# Patient Record
Sex: MALE | Race: WHITE | Employment: FULL TIME | URBAN - METROPOLITAN AREA
[De-identification: names, ages, dates, MRNs, and addresses within clinical notes are randomized per-mention and may not be internally consistent; named-entity substitution may affect disease eponyms.]

---

## 2018-05-21 LAB
AMB EXT CHOLESTEROL, TOTAL: 171 MG/DL
AMB EXT HDL CHOLESTEROL: 55 MG/DL
AMB EXT LDL CHOLESTEROL, DIRECT: 99 MG/DL
AMB EXT PSA SCREEN: 2.4 NG/ML
AMB EXT TRIGLYCERIDES: 87 MG/DL

## 2018-08-18 ENCOUNTER — APPOINTMENT (OUTPATIENT)
Dept: GENERAL RADIOLOGY | Age: 53
End: 2018-08-18
Attending: NURSE PRACTITIONER
Payer: COMMERCIAL

## 2018-08-18 ENCOUNTER — HOSPITAL ENCOUNTER (EMERGENCY)
Age: 53
Discharge: HOME | End: 2018-08-18
Attending: EMERGENCY MEDICINE
Payer: COMMERCIAL

## 2018-08-18 VITALS
SYSTOLIC BLOOD PRESSURE: 118 MMHG | HEART RATE: 87 BPM | WEIGHT: 260 LBS | RESPIRATION RATE: 18 BRPM | HEIGHT: 75 IN | BODY MASS INDEX: 32.33 KG/M2 | DIASTOLIC BLOOD PRESSURE: 74 MMHG | TEMPERATURE: 98 F | OXYGEN SATURATION: 97 %

## 2018-08-18 DIAGNOSIS — R06.00 DYSPNEA, UNSPECIFIED TYPE: Primary | ICD-10-CM

## 2018-08-18 LAB
ALBUMIN SERPL-MCNC: 4.1 G/DL (ref 3.5–4.8)
ALBUMIN/GLOB SERPL: 1.2 {RATIO} (ref 1–2)
ALP LIVER SERPL-CCNC: 55 U/L (ref 45–117)
ALT SERPL-CCNC: 32 U/L (ref 17–63)
ANION GAP SERPL CALC-SCNC: 11 MMOL/L (ref 0–18)
AST SERPL-CCNC: 21 U/L (ref 15–41)
ATRIAL RATE: 89 BPM
BASOPHILS # BLD AUTO: 0.04 X10(3) UL (ref 0–0.1)
BASOPHILS NFR BLD AUTO: 0.6 %
BILIRUB SERPL-MCNC: 0.6 MG/DL (ref 0.1–2)
BUN BLD-MCNC: 17 MG/DL (ref 8–20)
BUN/CREAT SERPL: 15.2 (ref 10–20)
CALCIUM BLD-MCNC: 8.6 MG/DL (ref 8.3–10.3)
CHLORIDE SERPL-SCNC: 105 MMOL/L (ref 101–111)
CO2 SERPL-SCNC: 23 MMOL/L (ref 22–32)
CREAT BLD-MCNC: 1.12 MG/DL (ref 0.7–1.3)
D-DIMER: <0.27 UG/ML FEU (ref 0–0.49)
EOSINOPHIL # BLD AUTO: 0.15 X10(3) UL (ref 0–0.3)
EOSINOPHIL NFR BLD AUTO: 2.4 %
ERYTHROCYTE [DISTWIDTH] IN BLOOD BY AUTOMATED COUNT: 12.3 % (ref 11.5–16)
GLOBULIN PLAS-MCNC: 3.3 G/DL (ref 2.5–4)
GLUCOSE BLD-MCNC: 98 MG/DL (ref 70–99)
HCT VFR BLD AUTO: 41 % (ref 37–53)
HGB BLD-MCNC: 14.3 G/DL (ref 13–17)
IMMATURE GRANULOCYTE COUNT: 0.02 X10(3) UL (ref 0–1)
IMMATURE GRANULOCYTE RATIO %: 0.3 %
LYMPHOCYTES # BLD AUTO: 1.35 X10(3) UL (ref 0.9–4)
LYMPHOCYTES NFR BLD AUTO: 21.9 %
M PROTEIN MFR SERPL ELPH: 7.4 G/DL (ref 6.1–8.3)
MCH RBC QN AUTO: 30.3 PG (ref 27–33.2)
MCHC RBC AUTO-ENTMCNC: 34.9 G/DL (ref 31–37)
MCV RBC AUTO: 86.9 FL (ref 80–99)
MONOCYTES # BLD AUTO: 0.55 X10(3) UL (ref 0.1–1)
MONOCYTES NFR BLD AUTO: 8.9 %
NEUTROPHIL ABS PRELIM: 4.06 X10 (3) UL (ref 1.3–6.7)
NEUTROPHILS # BLD AUTO: 4.06 X10(3) UL (ref 1.3–6.7)
NEUTROPHILS NFR BLD AUTO: 65.9 %
OSMOLALITY SERPL CALC.SUM OF ELEC: 290 MOSM/KG (ref 275–295)
P AXIS: 39 DEGREES
P-R INTERVAL: 202 MS
PLATELET # BLD AUTO: 220 10(3)UL (ref 150–450)
POTASSIUM SERPL-SCNC: 3.7 MMOL/L (ref 3.6–5.1)
Q-T INTERVAL: 376 MS
QRS DURATION: 94 MS
QTC CALCULATION (BEZET): 457 MS
R AXIS: -25 DEGREES
RBC # BLD AUTO: 4.72 X10(6)UL (ref 4.3–5.7)
RED CELL DISTRIBUTION WIDTH-SD: 39.6 FL (ref 35.1–46.3)
SODIUM SERPL-SCNC: 139 MMOL/L (ref 136–144)
T AXIS: 29 DEGREES
TROPONIN I SERPL-MCNC: <0.046 NG/ML (ref ?–0.05)
VENTRICULAR RATE: 89 BPM
WBC # BLD AUTO: 6.2 X10(3) UL (ref 4–13)

## 2018-08-18 PROCEDURE — 85378 FIBRIN DEGRADE SEMIQUANT: CPT | Performed by: NURSE PRACTITIONER

## 2018-08-18 PROCEDURE — 84484 ASSAY OF TROPONIN QUANT: CPT | Performed by: NURSE PRACTITIONER

## 2018-08-18 PROCEDURE — 80053 COMPREHEN METABOLIC PANEL: CPT | Performed by: NURSE PRACTITIONER

## 2018-08-18 PROCEDURE — 99285 EMERGENCY DEPT VISIT HI MDM: CPT

## 2018-08-18 PROCEDURE — 36415 COLL VENOUS BLD VENIPUNCTURE: CPT

## 2018-08-18 PROCEDURE — 85025 COMPLETE CBC W/AUTO DIFF WBC: CPT | Performed by: NURSE PRACTITIONER

## 2018-08-18 PROCEDURE — 71046 X-RAY EXAM CHEST 2 VIEWS: CPT | Performed by: NURSE PRACTITIONER

## 2018-08-18 PROCEDURE — 93010 ELECTROCARDIOGRAM REPORT: CPT

## 2018-08-18 PROCEDURE — 93005 ELECTROCARDIOGRAM TRACING: CPT

## 2018-08-18 RX ORDER — ALBUTEROL SULFATE 90 UG/1
2 AEROSOL, METERED RESPIRATORY (INHALATION) EVERY 4 HOURS PRN
Qty: 1 INHALER | Refills: 0 | Status: SHIPPED | OUTPATIENT
Start: 2018-08-18 | End: 2018-09-17

## 2018-08-18 NOTE — ED INITIAL ASSESSMENT (HPI)
Pt sts he was working out and became SOB. Sts he feels like it is hard to take a deep breath. Denies any chest pain. Sts he was diagnosed with asthma 10 years ago, but has not had an exacerbation for 10 years. Airway /lungs clear.

## 2018-08-18 NOTE — ED PROVIDER NOTES
Patient Seen in: [facility] In [place]    History   Patient presents with:  Dyspnea CYN SOB (respiratory)    Stated Complaint: difficulty breathing    59-year-old male who presents to the emergency room with complaints of dyspnea.   Pa All other systems reviewed and negative except as noted above.     Physical Exam   ED Triage Vitals [08/18/18 1312]  BP: 115/81  Pulse: 92  Resp: 16  Temp: 97.8 °F (36.6 °C)  Temp src: Temporal  SpO2: 98 %  O2 Device: None (Room air)    Current:/7 Abnormality         Status                     ---------                               -----------         ------                     CBC W/ DIFFERENTIAL[[id number]]                              Final result                 Please R-0

## 2019-01-27 ENCOUNTER — OFFICE VISIT (OUTPATIENT)
Dept: FAMILY MEDICINE CLINIC | Facility: CLINIC | Age: 54
End: 2019-01-27
Payer: COMMERCIAL

## 2019-01-27 VITALS
RESPIRATION RATE: 16 BRPM | TEMPERATURE: 98 F | HEART RATE: 56 BPM | DIASTOLIC BLOOD PRESSURE: 72 MMHG | SYSTOLIC BLOOD PRESSURE: 112 MMHG | WEIGHT: 255.38 LBS | BODY MASS INDEX: 32 KG/M2 | OXYGEN SATURATION: 99 %

## 2019-01-27 DIAGNOSIS — H10.9 CONJUNCTIVITIS OF LEFT EYE, UNSPECIFIED CONJUNCTIVITIS TYPE: Primary | ICD-10-CM

## 2019-01-27 PROCEDURE — 99202 OFFICE O/P NEW SF 15 MIN: CPT | Performed by: NURSE PRACTITIONER

## 2019-01-27 RX ORDER — OFLOXACIN 3 MG/ML
2 SOLUTION/ DROPS OPHTHALMIC 4 TIMES DAILY
Qty: 1 BOTTLE | Refills: 0 | Status: SHIPPED | OUTPATIENT
Start: 2019-01-27 | End: 2019-02-03

## 2019-01-27 NOTE — PROGRESS NOTES
CHIEF COMPLAINT:   Patient presents with:  Eye Problem: left eye, red, started today      HPI:   Gabrielle Sanchez is a 48year old male who presents with chief complaint of eye irritation. Symptoms began 1  days ago. Symptoms have been unchanged since onset. SpO2 99%   BMI 31.92 kg/m²   GENERAL: well developed, well nourished,in no apparent distress  SKIN: no rashes,no suspicious lesions  EYES: PERRLA, EOMI, pos conjunctiva erythematous, injected, pos discharge.   HENT: atraumatic, normocephalic, TMs non injec

## 2019-02-05 ENCOUNTER — OFFICE VISIT (OUTPATIENT)
Dept: FAMILY MEDICINE CLINIC | Facility: CLINIC | Age: 54
End: 2019-02-05
Payer: COMMERCIAL

## 2019-02-05 VITALS
HEIGHT: 75.5 IN | TEMPERATURE: 98 F | WEIGHT: 256 LBS | SYSTOLIC BLOOD PRESSURE: 110 MMHG | BODY MASS INDEX: 31.5 KG/M2 | RESPIRATION RATE: 16 BRPM | HEART RATE: 60 BPM | DIASTOLIC BLOOD PRESSURE: 70 MMHG

## 2019-02-05 DIAGNOSIS — E78.00 PURE HYPERCHOLESTEROLEMIA: Primary | ICD-10-CM

## 2019-02-05 DIAGNOSIS — R21 RASH: ICD-10-CM

## 2019-02-05 PROCEDURE — 99203 OFFICE O/P NEW LOW 30 MIN: CPT | Performed by: FAMILY MEDICINE

## 2019-02-05 RX ORDER — ATORVASTATIN CALCIUM 10 MG/1
10 TABLET, FILM COATED ORAL NIGHTLY
Qty: 90 TABLET | Refills: 3 | Status: SHIPPED | OUTPATIENT
Start: 2019-02-05 | End: 2020-02-17

## 2019-02-05 NOTE — PROGRESS NOTES
Rg Longoria is a 48year old male and is new to our office. Patient presents today to establish care. The patient's history includes:  Gets executive physical yearly at [facility]. Labs done there every May. · Lipids:  Atorvastatin 10mg daily.   Last LDL insecurity - inability: Not on file      Transportation needs - medical: Not on file      Transportation needs - non-medical: Not on file    Occupational History      Not on file    Tobacco Use      Smoking status: Never Smoker      Smokeless tobacco: Kike Gonzalez

## 2019-04-02 ENCOUNTER — OFFICE VISIT (OUTPATIENT)
Dept: FAMILY MEDICINE CLINIC | Facility: CLINIC | Age: 54
End: 2019-04-02
Payer: COMMERCIAL

## 2019-04-02 VITALS
HEIGHT: 75.5 IN | SYSTOLIC BLOOD PRESSURE: 120 MMHG | BODY MASS INDEX: 32.12 KG/M2 | DIASTOLIC BLOOD PRESSURE: 70 MMHG | RESPIRATION RATE: 16 BRPM | WEIGHT: 261 LBS | TEMPERATURE: 98 F | HEART RATE: 60 BPM

## 2019-04-02 DIAGNOSIS — R22.9 SKIN LUMPS: Primary | ICD-10-CM

## 2019-04-02 DIAGNOSIS — R21 RASH AND NONSPECIFIC SKIN ERUPTION: ICD-10-CM

## 2019-04-02 PROCEDURE — 99213 OFFICE O/P EST LOW 20 MIN: CPT | Performed by: FAMILY MEDICINE

## 2019-04-02 NOTE — PROGRESS NOTES
Chief Complaint:  Patient presents with:  Rash: Itchy Rash on both sides of Hips- worse at night, noticed bump on Right side of abdomen    HPI:  This is a 48year old male patient presenting for Rash (Itchy Rash on both sides of Hips- worse at night, notic session: 1 or 2      Binge frequency: Less than monthly    Drug use: Not on file         Current Outpatient Medications:  atorvastatin 10 MG Oral Tab Take 1 tablet (10 mg total) by mouth nightly.  Disp: 90 tablet Rfl: 3   triamcinolone acetonide 0.1 % Exter returning to the clinic reviewed and patient demonstrated understanding.   Randolph Donaldson DO  4/2/2019 10:32 AM  Family Medicine

## 2019-05-03 ENCOUNTER — HOSPITAL ENCOUNTER (OUTPATIENT)
Dept: ULTRASOUND IMAGING | Age: 54
Discharge: HOME | End: 2019-05-03
Attending: FAMILY MEDICINE
Payer: COMMERCIAL

## 2019-05-03 DIAGNOSIS — R22.9 SKIN LUMPS: ICD-10-CM

## 2019-05-03 PROCEDURE — 76705 ECHO EXAM OF ABDOMEN: CPT | Performed by: FAMILY MEDICINE

## 2019-05-07 ENCOUNTER — TELEPHONE (OUTPATIENT)
Dept: FAMILY MEDICINE CLINIC | Facility: CLINIC | Age: 54
End: 2019-05-07

## 2019-05-07 NOTE — TELEPHONE ENCOUNTER
We could refer him to another derm but I would actually have him call back Dr Dillon Reyes office and make sure Dr Yi Parker is aware he is not better. His note directly says to call if not better in 2 weeks.  I would expect he had another idea if this was not

## 2019-05-07 NOTE — TELEPHONE ENCOUNTER
Called and talked to patient he has finished the keflex and has no improvement in the bumps stated that they are worse feel scaly.  He tried to call the office as directed by Dr Rachel Tran and they told him he could not be seen until mid June.  patient stated

## 2019-05-07 NOTE — TELEPHONE ENCOUNTER
Patient has been seen for a rash on his hips and abdomen that has not gotten better, starting to become scaly. Patient has seen Dr. Catarino Pfeiffer but is unable to see him again until mid June.  Patient is wondering if there is another dermatologist he can be refe

## 2019-06-26 ENCOUNTER — TELEPHONE (OUTPATIENT)
Dept: FAMILY MEDICINE CLINIC | Facility: CLINIC | Age: 54
End: 2019-06-26

## 2019-06-26 DIAGNOSIS — Z23 NEED FOR SHINGLES VACCINE: Primary | ICD-10-CM

## 2019-07-05 PROBLEM — E78.00 PURE HYPERCHOLESTEROLEMIA: Status: ACTIVE | Noted: 2017-04-03

## 2019-07-05 PROBLEM — L91.8 ACROCHORDON: Status: ACTIVE | Noted: 2017-04-05

## 2019-07-05 PROBLEM — R73.9 HYPERGLYCEMIA: Status: ACTIVE | Noted: 2017-04-03

## 2019-07-05 PROBLEM — Z96.653 PRESENCE OF BOTH ARTIFICIAL KNEE JOINTS: Status: ACTIVE | Noted: 2017-04-03

## 2019-07-05 PROBLEM — D22.9 MULTIPLE BENIGN MELANOCYTIC NEVI: Status: ACTIVE | Noted: 2017-04-05

## 2019-07-05 RX ORDER — SILDENAFIL CITRATE 20 MG/1
TABLET ORAL
Refills: 2 | COMMUNITY
Start: 2019-04-26

## 2019-07-08 ENCOUNTER — NURSE ONLY (OUTPATIENT)
Dept: FAMILY MEDICINE CLINIC | Facility: CLINIC | Age: 54
End: 2019-07-08
Payer: COMMERCIAL

## 2019-07-08 DIAGNOSIS — Z23 NEED FOR VACCINATION: Primary | ICD-10-CM

## 2019-07-08 PROCEDURE — 90750 HZV VACC RECOMBINANT IM: CPT | Performed by: FAMILY MEDICINE

## 2019-07-08 PROCEDURE — 90471 IMMUNIZATION ADMIN: CPT | Performed by: FAMILY MEDICINE

## 2019-07-08 NOTE — PROGRESS NOTES
Patient presents today for his first Shingrix vaccine. He has no complaints. He is given the VIS. The chart and vaccine was reviewed by myself and Tremaine Melton RN. The Shingrix vaccine was ordered by Med Cortes PA-C.   The patient is reminded to c

## 2019-08-21 NOTE — TELEPHONE ENCOUNTER
LOV 4/2/2019     Patient was asked to follow-up in:Follow-up timeframe not documented in note     Appointment scheduled: Visit date not found     Refill request for:    Requested Prescriptions     Pending Prescriptions Disp Refills   • TRIAMCINOLONE ACETON

## 2019-09-27 NOTE — TELEPHONE ENCOUNTER
Pt lov 4/2/19, no recent lab work, no upcoming visit. Is it ok to refill Triamcinolone cream? Please advise.  Routed to Dr Robby Benavides

## 2019-11-13 NOTE — TELEPHONE ENCOUNTER
LOV 4/2/19, no recent labs, no upcoming appointment  Will you authorize refill on Triamcinolone Cream?

## 2019-11-18 ENCOUNTER — NURSE ONLY (OUTPATIENT)
Dept: FAMILY MEDICINE CLINIC | Facility: CLINIC | Age: 54
End: 2019-11-18
Payer: COMMERCIAL

## 2019-11-18 ENCOUNTER — TELEPHONE (OUTPATIENT)
Dept: FAMILY MEDICINE CLINIC | Facility: CLINIC | Age: 54
End: 2019-11-18

## 2019-11-18 DIAGNOSIS — Z23 NEED FOR SHINGLES VACCINE: Primary | ICD-10-CM

## 2019-11-18 PROCEDURE — 90750 HZV VACC RECOMBINANT IM: CPT | Performed by: FAMILY MEDICINE

## 2019-11-18 PROCEDURE — 90471 IMMUNIZATION ADMIN: CPT | Performed by: FAMILY MEDICINE

## 2019-11-18 NOTE — PROGRESS NOTES
Patient presents for his second dose of Shingrix. He is given the VIS. He has signed a waiver. The Shingrix is given in the left deltoid.   After sitting for a few minutes the patient left the office without complaint

## 2019-11-18 NOTE — TELEPHONE ENCOUNTER
Patient has appt today for his second dose of Shingrix. First dose was given 7/8/19. Order pended for your review.

## 2020-02-17 RX ORDER — ATORVASTATIN CALCIUM 10 MG/1
TABLET, FILM COATED ORAL
Qty: 30 TABLET | Refills: 0 | Status: SHIPPED | OUTPATIENT
Start: 2020-02-17 | End: 2020-03-03

## 2020-02-17 NOTE — TELEPHONE ENCOUNTER
Requested Prescriptions     Pending Prescriptions Disp Refills   • ATORVASTATIN 10 MG Oral Tab [Pharmacy Med Name: ATORVASTATIN 10MG TABLETS] 90 tablet 3     Sig: TAKE 1 TABLET(10 MG) BY MOUTH EVERY NIGHT     LOV 4/2/2019 (acute), 2/5/19 physical     Patie

## 2020-03-03 ENCOUNTER — OFFICE VISIT (OUTPATIENT)
Dept: FAMILY MEDICINE CLINIC | Facility: CLINIC | Age: 55
End: 2020-03-03
Payer: COMMERCIAL

## 2020-03-03 VITALS
BODY MASS INDEX: 34.31 KG/M2 | HEIGHT: 74.5 IN | HEART RATE: 68 BPM | TEMPERATURE: 98 F | WEIGHT: 270.19 LBS | DIASTOLIC BLOOD PRESSURE: 86 MMHG | SYSTOLIC BLOOD PRESSURE: 122 MMHG | RESPIRATION RATE: 16 BRPM

## 2020-03-03 DIAGNOSIS — E78.00 PURE HYPERCHOLESTEROLEMIA: Primary | ICD-10-CM

## 2020-03-03 DIAGNOSIS — C61 PROSTATE CANCER (HCC): ICD-10-CM

## 2020-03-03 PROCEDURE — 99214 OFFICE O/P EST MOD 30 MIN: CPT | Performed by: FAMILY MEDICINE

## 2020-03-03 RX ORDER — ASPIRIN 81 MG/1
81 TABLET ORAL DAILY
COMMUNITY
Start: 2019-10-02

## 2020-03-03 RX ORDER — ATORVASTATIN CALCIUM 10 MG/1
TABLET, FILM COATED ORAL
Qty: 90 TABLET | Refills: 3 | Status: SHIPPED | OUTPATIENT
Start: 2020-03-03 | End: 2021-04-06

## 2020-03-03 NOTE — PROGRESS NOTES
Renzo Green is a 47year old male. Gets executive physical yearly at [facility]. Labs done there every May. · Lipids:  Atorvastatin 10mg daily. Last LDL 99.  · CORAL:  Never used CPAP  · Seeing Dr. George Fernandez: hx of squamous cell. · Hx of shingles in 2017. insecurity:        Worry: Not on file        Inability: Not on file      Transportation needs:        Medical: Not on file        Non-medical: Not on file    Tobacco Use      Smoking status: Never Smoker      Smokeless tobacco: Never Used    Substance and Temp 98 °F (36.7 °C) (Oral)   Resp 16   Ht 74.5\"   Wt 270 lb 3.2 oz (122.6 kg)   BMI 34.23 kg/m²   GENERAL: well developed, well nourished,in no apparent distress  NECK: supple,no adenopathy,no thyromegaly  LUNGS: CTA, no RRW  CARDIO: RRR without murmur

## 2020-10-09 ENCOUNTER — LABORATORY ENCOUNTER (OUTPATIENT)
Dept: LAB | Age: 55
End: 2020-10-09
Attending: FAMILY MEDICINE
Payer: COMMERCIAL

## 2020-10-09 DIAGNOSIS — C61 PROSTATE CANCER (HCC): ICD-10-CM

## 2020-10-09 PROCEDURE — 84153 ASSAY OF PSA TOTAL: CPT | Performed by: FAMILY MEDICINE

## 2020-10-09 PROCEDURE — 36415 COLL VENOUS BLD VENIPUNCTURE: CPT | Performed by: FAMILY MEDICINE

## 2020-11-06 ENCOUNTER — OFFICE VISIT (OUTPATIENT)
Dept: FAMILY MEDICINE CLINIC | Facility: CLINIC | Age: 55
End: 2020-11-06
Payer: COMMERCIAL

## 2020-11-06 VITALS
BODY MASS INDEX: 33.9 KG/M2 | HEART RATE: 68 BPM | SYSTOLIC BLOOD PRESSURE: 118 MMHG | TEMPERATURE: 98 F | DIASTOLIC BLOOD PRESSURE: 82 MMHG | RESPIRATION RATE: 16 BRPM | WEIGHT: 267 LBS | HEIGHT: 74.5 IN

## 2020-11-06 DIAGNOSIS — M70.21 OLECRANON BURSITIS OF RIGHT ELBOW: Primary | ICD-10-CM

## 2020-11-06 DIAGNOSIS — C61 PROSTATE CANCER (HCC): ICD-10-CM

## 2020-11-06 PROCEDURE — 3074F SYST BP LT 130 MM HG: CPT | Performed by: FAMILY MEDICINE

## 2020-11-06 PROCEDURE — 3008F BODY MASS INDEX DOCD: CPT | Performed by: FAMILY MEDICINE

## 2020-11-06 PROCEDURE — 99072 ADDL SUPL MATRL&STAF TM PHE: CPT | Performed by: FAMILY MEDICINE

## 2020-11-06 PROCEDURE — 3079F DIAST BP 80-89 MM HG: CPT | Performed by: FAMILY MEDICINE

## 2020-11-06 PROCEDURE — 99213 OFFICE O/P EST LOW 20 MIN: CPT | Performed by: FAMILY MEDICINE

## 2020-11-06 RX ORDER — NAPROXEN 500 MG/1
500 TABLET ORAL 2 TIMES DAILY WITH MEALS
Qty: 60 TABLET | Refills: 0 | Status: SHIPPED | OUTPATIENT
Start: 2020-11-06

## 2020-11-06 NOTE — PROGRESS NOTES
Rey Martin is a 54year old male. HPI:   Patient presents with swelling of the right elbow. Had bursitis several years ago. Sx returned 8/2020 - he was treated with Keflex (telehealth visit).   He reports sx did improve, but swelling never resolve Prescriptions Disp Refills   • naproxen 500 MG Oral Tab 60 tablet 0     Sig: Take 1 tablet (500 mg total) by mouth 2 (two) times daily with meals.      Imaging & Consults:  ORTHOPEDIC - INTERNAL

## 2020-12-04 RX ORDER — NAPROXEN 500 MG/1
TABLET ORAL
Qty: 60 TABLET | Refills: 0 | OUTPATIENT
Start: 2020-12-04

## 2020-12-07 ENCOUNTER — TELEMEDICINE (OUTPATIENT)
Dept: TELEHEALTH | Age: 55
End: 2020-12-07

## 2020-12-07 DIAGNOSIS — Z02.9 ENCOUNTERS FOR ADMINISTRATIVE PURPOSE: Primary | ICD-10-CM

## 2020-12-07 PROCEDURE — 99499 UNLISTED E&M SERVICE: CPT | Performed by: PHYSICIAN ASSISTANT

## 2020-12-12 ENCOUNTER — PATIENT MESSAGE (OUTPATIENT)
Dept: FAMILY MEDICINE CLINIC | Facility: CLINIC | Age: 55
End: 2020-12-12

## 2020-12-12 NOTE — TELEPHONE ENCOUNTER
From: Rey Martin  To: Michael Orona MD  Sent: 12/12/2020 8:45 AM CST  Subject: Non-Urgent Medical Question    I typically get the build-up of ear wax cleaned out during my yearly physical but did not happen this year.  I feel like there is a build-up

## 2020-12-15 ENCOUNTER — OFFICE VISIT (OUTPATIENT)
Dept: FAMILY MEDICINE CLINIC | Facility: CLINIC | Age: 55
End: 2020-12-15
Payer: COMMERCIAL

## 2020-12-15 VITALS
BODY MASS INDEX: 35.27 KG/M2 | RESPIRATION RATE: 14 BRPM | TEMPERATURE: 98 F | SYSTOLIC BLOOD PRESSURE: 106 MMHG | HEIGHT: 74 IN | WEIGHT: 274.81 LBS | HEART RATE: 72 BPM | DIASTOLIC BLOOD PRESSURE: 74 MMHG

## 2020-12-15 DIAGNOSIS — H61.23 EXCESSIVE CERUMEN IN BOTH EAR CANALS: ICD-10-CM

## 2020-12-15 DIAGNOSIS — L29.9 EAR ITCHING: Primary | ICD-10-CM

## 2020-12-15 PROCEDURE — 3008F BODY MASS INDEX DOCD: CPT | Performed by: NURSE PRACTITIONER

## 2020-12-15 PROCEDURE — 3074F SYST BP LT 130 MM HG: CPT | Performed by: NURSE PRACTITIONER

## 2020-12-15 PROCEDURE — 99213 OFFICE O/P EST LOW 20 MIN: CPT | Performed by: NURSE PRACTITIONER

## 2020-12-15 PROCEDURE — 3078F DIAST BP <80 MM HG: CPT | Performed by: NURSE PRACTITIONER

## 2020-12-15 PROCEDURE — 69210 REMOVE IMPACTED EAR WAX UNI: CPT | Performed by: NURSE PRACTITIONER

## 2020-12-15 NOTE — PROGRESS NOTES
Patient presents with:  Ear Problem: ear wax removal       HPI:  Presents with approx several week history of right ear itching and history of cerumen impactions. Stated he has needed his ear cleaned \"About once a year every year.  Denies ear pain/pressure done due to cerumen obstruction in the ear canal. Requiring cerumen removal:  Procedure:  Removal of cerumen of bilateral canal. The removal of impacted cerumen required the expertise of an APN provider and was personally performed by the APN provider.   Ilana

## 2021-03-30 NOTE — TELEPHONE ENCOUNTER
Requested Prescriptions     Pending Prescriptions Disp Refills   • ATORVASTATIN 10 MG Oral Tab [Pharmacy Med Name: ATORVASTATIN 10MG TABLETS] 90 tablet 3     Sig: TAKE 1 TABLET(10 MG) BY MOUTH EVERY NIGHT     LOV 12/15/2020     Patient was asked to follow-

## 2021-04-05 ENCOUNTER — PATIENT MESSAGE (OUTPATIENT)
Dept: FAMILY MEDICINE CLINIC | Facility: CLINIC | Age: 56
End: 2021-04-05

## 2021-04-05 NOTE — TELEPHONE ENCOUNTER
From: Emily Herzog  To: Fred Moreira MD  Sent: 4/5/2021 3:48 PM CDT  Subject: Mariposa Bynum    I received a message from [employer] saying I cannot get a refill of Atvorsatin until I get a physical which I am happy to do wit

## 2021-04-06 ENCOUNTER — TELEPHONE (OUTPATIENT)
Dept: FAMILY MEDICINE CLINIC | Facility: CLINIC | Age: 56
End: 2021-04-06

## 2021-04-06 DIAGNOSIS — Z00.00 ROUTINE GENERAL MEDICAL EXAMINATION AT A HEALTH CARE FACILITY: Primary | ICD-10-CM

## 2021-04-06 RX ORDER — ATORVASTATIN CALCIUM 10 MG/1
TABLET, FILM COATED ORAL
Qty: 90 TABLET | Refills: 0 | Status: SHIPPED | OUTPATIENT
Start: 2021-04-06 | End: 2021-05-17

## 2021-04-06 NOTE — TELEPHONE ENCOUNTER
Future Appointments   Date Time Provider Prashant Margarita   5/25/2021  9:00 AM Bertha Connell MD [facility]   6/16/2021  7:30 AM Jordyn Calvert MD [facility]     Please order labs for upcoming appt.  Also Atorvastatin does not pass pro

## 2021-04-06 NOTE — TELEPHONE ENCOUNTER
Please enter lab orders for the patient's upcoming physical appointment. Physical scheduled:    Your appointments     Date & Time Appointment Department [facility])    May 25, 2021  9:00 AM CDT Adult Physical with Alberto Mckeon MD [facility],

## 2021-04-09 DIAGNOSIS — Z23 NEED FOR VACCINATION: ICD-10-CM

## 2021-04-13 ENCOUNTER — IMMUNIZATION (OUTPATIENT)
Dept: LAB | Age: 56
End: 2021-04-13
Attending: HOSPITALIST
Payer: COMMERCIAL

## 2021-04-13 DIAGNOSIS — Z23 NEED FOR VACCINATION: Primary | ICD-10-CM

## 2021-04-13 PROBLEM — C61 PRIMARY MALIGNANT NEOPLASM OF PROSTATE (HCC): Status: ACTIVE | Noted: 2019-07-22

## 2021-04-13 PROBLEM — D47.9 LYMPHOPROLIFERATIVE DISORDER (HCC): Status: ACTIVE | Noted: 2019-08-29

## 2021-04-13 PROBLEM — R59.1 LYMPHADENOPATHY: Status: ACTIVE | Noted: 2019-08-29

## 2021-04-13 PROBLEM — C85.90 MALIGNANT LYMPHOMA, NON-HODGKIN'S (HCC): Status: ACTIVE | Noted: 2019-09-04

## 2021-04-13 PROBLEM — E78.5 HYPERLIPIDEMIA: Status: ACTIVE | Noted: 2020-06-12

## 2021-04-13 PROCEDURE — 0001A SARSCOV2 VAC 30MCG/0.3ML IM: CPT

## 2021-04-13 RX ORDER — ATORVASTATIN CALCIUM 10 MG/1
TABLET, FILM COATED ORAL
Qty: 90 TABLET | Refills: 3 | OUTPATIENT
Start: 2021-04-13

## 2021-04-13 NOTE — TELEPHONE ENCOUNTER
ATORVASTATIN 10MG TABLETS     The original prescription was reordered on 4/6/2021 by Samantha Escobar MD. Renewing this prescription may not be appropriate.      Possible duplicate: Hover to review recent actions on this medication    Sig: TAKE 1 TABLET(10

## 2021-04-29 ENCOUNTER — PATIENT MESSAGE (OUTPATIENT)
Dept: FAMILY MEDICINE CLINIC | Facility: CLINIC | Age: 56
End: 2021-04-29

## 2021-05-04 ENCOUNTER — IMMUNIZATION (OUTPATIENT)
Dept: LAB | Age: 56
End: 2021-05-04
Attending: HOSPITALIST
Payer: COMMERCIAL

## 2021-05-04 ENCOUNTER — LAB ENCOUNTER (OUTPATIENT)
Dept: LAB | Age: 56
End: 2021-05-04
Attending: FAMILY MEDICINE
Payer: COMMERCIAL

## 2021-05-04 DIAGNOSIS — Z00.00 ROUTINE GENERAL MEDICAL EXAMINATION AT A HEALTH CARE FACILITY: ICD-10-CM

## 2021-05-04 DIAGNOSIS — Z23 NEED FOR VACCINATION: Primary | ICD-10-CM

## 2021-05-04 DIAGNOSIS — C61 PROSTATE CANCER (HCC): ICD-10-CM

## 2021-05-04 PROCEDURE — 84153 ASSAY OF PSA TOTAL: CPT | Performed by: FAMILY MEDICINE

## 2021-05-04 PROCEDURE — 0002A SARSCOV2 VAC 30MCG/0.3ML IM: CPT

## 2021-05-04 PROCEDURE — 80061 LIPID PANEL: CPT | Performed by: FAMILY MEDICINE

## 2021-05-04 PROCEDURE — 80053 COMPREHEN METABOLIC PANEL: CPT | Performed by: FAMILY MEDICINE

## 2021-05-04 PROCEDURE — 85027 COMPLETE CBC AUTOMATED: CPT | Performed by: FAMILY MEDICINE

## 2021-05-17 ENCOUNTER — OFFICE VISIT (OUTPATIENT)
Dept: FAMILY MEDICINE CLINIC | Facility: CLINIC | Age: 56
End: 2021-05-17
Payer: COMMERCIAL

## 2021-05-17 VITALS
DIASTOLIC BLOOD PRESSURE: 70 MMHG | RESPIRATION RATE: 16 BRPM | OXYGEN SATURATION: 100 % | HEART RATE: 78 BPM | WEIGHT: 254 LBS | SYSTOLIC BLOOD PRESSURE: 122 MMHG | HEIGHT: 75 IN | TEMPERATURE: 98 F | BODY MASS INDEX: 31.58 KG/M2

## 2021-05-17 DIAGNOSIS — C61 PROSTATE CANCER (HCC): ICD-10-CM

## 2021-05-17 DIAGNOSIS — E78.00 PURE HYPERCHOLESTEROLEMIA: ICD-10-CM

## 2021-05-17 DIAGNOSIS — D72.819 LEUKOPENIA, UNSPECIFIED TYPE: ICD-10-CM

## 2021-05-17 DIAGNOSIS — Z00.00 ROUTINE GENERAL MEDICAL EXAMINATION AT A HEALTH CARE FACILITY: Primary | ICD-10-CM

## 2021-05-17 PROBLEM — E78.5 HYPERLIPIDEMIA: Status: RESOLVED | Noted: 2020-06-12 | Resolved: 2021-05-17

## 2021-05-17 PROBLEM — L91.8 ACROCHORDON: Status: RESOLVED | Noted: 2017-04-05 | Resolved: 2021-05-17

## 2021-05-17 PROCEDURE — 3074F SYST BP LT 130 MM HG: CPT | Performed by: FAMILY MEDICINE

## 2021-05-17 PROCEDURE — 3008F BODY MASS INDEX DOCD: CPT | Performed by: FAMILY MEDICINE

## 2021-05-17 PROCEDURE — 99396 PREV VISIT EST AGE 40-64: CPT | Performed by: FAMILY MEDICINE

## 2021-05-17 PROCEDURE — 3078F DIAST BP <80 MM HG: CPT | Performed by: FAMILY MEDICINE

## 2021-05-17 RX ORDER — ATORVASTATIN CALCIUM 20 MG/1
20 TABLET, FILM COATED ORAL NIGHTLY
Qty: 90 TABLET | Refills: 3 | Status: SHIPPED | OUTPATIENT
Start: 2021-05-17

## 2021-05-17 NOTE — PROGRESS NOTES
Reynaldo Choi is a 54year old male. Colon:  4/4/2017 - repeat in 5 years  Gets executive physical yearly at [facility]. Labs done there every May. Lipids:  Atorvastatin 10mg daily. .   Plan to increase to 20mg daily  CORAL:  Never used CPAP  Seeing  Lymphadenopathy    Past Surgical History:   Procedure Laterality Date   • KNEE ARTHROSCOPY Right 09/28/2011    Dr. Tang Guallpa    • KNEE ARTHROSCOPY Right 2005   • KNEE ARTHROSCOPY Left 1997   • KNEE REPLACEMENT SURGERY      b/l     Family History   Problem Re Social Connections:       Frequency of Communication with Friends and Family:       Frequency of Social Gatherings with Friends and Family:       Attends [employer]ah[employer] Services:       Active Member of Clubs or Organizations:       Attends Club or William

## 2021-09-07 ENCOUNTER — TELEMEDICINE (OUTPATIENT)
Dept: TELEHEALTH | Age: 56
End: 2021-09-07
Payer: COMMERCIAL

## 2021-09-07 DIAGNOSIS — Z02.9 ADMINISTRATIVE ENCOUNTER: Primary | ICD-10-CM

## 2021-09-07 PROCEDURE — 99499 UNLISTED E&M SERVICE: CPT | Performed by: NURSE PRACTITIONER

## 2021-09-07 NOTE — PROGRESS NOTES
Well nourished 65 yo male with complaints of left forearm swelling and redness with + warmth for (3) days. He relates that he has been placing hydrocortisone cream to area without much benefit. Denies injury or breaks in the skin.   Informed that could co

## 2021-12-15 ENCOUNTER — IMMUNIZATION (OUTPATIENT)
Dept: LAB | Facility: HOSPITAL | Age: 56
End: 2021-12-15
Attending: EMERGENCY MEDICINE
Payer: COMMERCIAL

## 2021-12-15 DIAGNOSIS — Z23 NEED FOR VACCINATION: Primary | ICD-10-CM

## 2021-12-15 PROCEDURE — 0004A SARSCOV2 VAC 30MCG/0.3ML IM: CPT

## 2022-03-28 ENCOUNTER — PATIENT MESSAGE (OUTPATIENT)
Dept: FAMILY MEDICINE CLINIC | Facility: CLINIC | Age: 57
End: 2022-03-28

## 2022-03-28 NOTE — TELEPHONE ENCOUNTER
From: Collette Guaman  To: Giana Scherer MD  Sent: 3/28/2022 3:25 PM CDT  Subject: PSA test    I have a PSA test waiting in my profile and need to schedule it. How can I best do that?

## 2022-04-04 ENCOUNTER — LAB ENCOUNTER (OUTPATIENT)
Dept: LAB | Age: 57
End: 2022-04-04
Attending: FAMILY MEDICINE
Payer: COMMERCIAL

## 2022-04-04 DIAGNOSIS — D72.819 LEUKOPENIA, UNSPECIFIED TYPE: ICD-10-CM

## 2022-04-04 DIAGNOSIS — C61 PROSTATE CANCER (HCC): ICD-10-CM

## 2022-04-04 LAB
BASOPHILS # BLD AUTO: 0.04 X10(3) UL (ref 0–0.2)
BASOPHILS NFR BLD AUTO: 0.6 %
EOSINOPHIL # BLD AUTO: 0.23 X10(3) UL (ref 0–0.7)
EOSINOPHIL NFR BLD AUTO: 3.2 %
ERYTHROCYTE [DISTWIDTH] IN BLOOD BY AUTOMATED COUNT: 12.9 %
HCT VFR BLD AUTO: 41.6 %
HGB BLD-MCNC: 13.7 G/DL
IMM GRANULOCYTES # BLD AUTO: 0.03 X10(3) UL (ref 0–1)
IMM GRANULOCYTES NFR BLD: 0.4 %
LYMPHOCYTES # BLD AUTO: 1.73 X10(3) UL (ref 1–4)
LYMPHOCYTES NFR BLD AUTO: 23.8 %
MCH RBC QN AUTO: 30.2 PG (ref 26–34)
MCHC RBC AUTO-ENTMCNC: 32.9 G/DL (ref 31–37)
MCV RBC AUTO: 91.8 FL
MONOCYTES # BLD AUTO: 0.56 X10(3) UL (ref 0.1–1)
MONOCYTES NFR BLD AUTO: 7.7 %
NEUTROPHILS # BLD AUTO: 4.67 X10 (3) UL (ref 1.5–7.7)
NEUTROPHILS # BLD AUTO: 4.67 X10(3) UL (ref 1.5–7.7)
NEUTROPHILS NFR BLD AUTO: 64.3 %
PLATELET # BLD AUTO: 236 10(3)UL (ref 150–450)
PSA SERPL-MCNC: <0.01 NG/ML (ref ?–4)
RBC # BLD AUTO: 4.53 X10(6)UL
WBC # BLD AUTO: 7.3 X10(3) UL (ref 4–11)

## 2022-04-04 PROCEDURE — 84153 ASSAY OF PSA TOTAL: CPT

## 2022-04-04 PROCEDURE — 85025 COMPLETE CBC W/AUTO DIFF WBC: CPT

## 2022-06-09 RX ORDER — ATORVASTATIN CALCIUM 20 MG/1
20 TABLET, FILM COATED ORAL NIGHTLY
Qty: 90 TABLET | Refills: 0 | OUTPATIENT
Start: 2022-06-09

## 2022-06-09 NOTE — TELEPHONE ENCOUNTER
Pt has scheduled an appt with Cirilo Roblero on 6/15/22 and is okay with his medication until appt (no refill needed at this time)

## 2022-06-15 ENCOUNTER — OFFICE VISIT (OUTPATIENT)
Dept: FAMILY MEDICINE CLINIC | Facility: CLINIC | Age: 57
End: 2022-06-15
Payer: COMMERCIAL

## 2022-06-15 VITALS
HEART RATE: 56 BPM | SYSTOLIC BLOOD PRESSURE: 104 MMHG | BODY MASS INDEX: 42.19 KG/M2 | TEMPERATURE: 97 F | RESPIRATION RATE: 16 BRPM | WEIGHT: 265.63 LBS | OXYGEN SATURATION: 99 % | HEIGHT: 66.54 IN | DIASTOLIC BLOOD PRESSURE: 80 MMHG

## 2022-06-15 DIAGNOSIS — Z12.11 SCREENING FOR COLON CANCER: ICD-10-CM

## 2022-06-15 DIAGNOSIS — E78.00 PURE HYPERCHOLESTEROLEMIA: Primary | ICD-10-CM

## 2022-06-15 PROCEDURE — 3074F SYST BP LT 130 MM HG: CPT | Performed by: NURSE PRACTITIONER

## 2022-06-15 PROCEDURE — 3008F BODY MASS INDEX DOCD: CPT | Performed by: NURSE PRACTITIONER

## 2022-06-15 PROCEDURE — 3079F DIAST BP 80-89 MM HG: CPT | Performed by: NURSE PRACTITIONER

## 2022-06-15 PROCEDURE — 99213 OFFICE O/P EST LOW 20 MIN: CPT | Performed by: NURSE PRACTITIONER

## 2022-06-15 RX ORDER — ATORVASTATIN CALCIUM 20 MG/1
20 TABLET, FILM COATED ORAL NIGHTLY
Qty: 90 TABLET | Refills: 3 | Status: SHIPPED | OUTPATIENT
Start: 2022-06-15

## 2023-08-23 ENCOUNTER — IMMUNIZATION (OUTPATIENT)
Dept: LAB | Age: 58
End: 2023-08-23
Attending: EMERGENCY MEDICINE
Payer: COMMERCIAL

## 2023-08-23 DIAGNOSIS — Z23 NEED FOR VACCINATION: Primary | ICD-10-CM

## 2023-08-23 PROCEDURE — 0121A SARSCOV2 VAC BVL 30MCG/0.3ML: CPT

## 2023-12-28 ENCOUNTER — TELEPHONE (OUTPATIENT)
Dept: FAMILY MEDICINE CLINIC | Facility: CLINIC | Age: 58
End: 2023-12-28

## 2023-12-28 DIAGNOSIS — C61 PRIMARY MALIGNANT NEOPLASM OF PROSTATE (HCC): Primary | ICD-10-CM

## 2023-12-28 DIAGNOSIS — Z13.220 SCREENING FOR LIPID DISORDERS: ICD-10-CM

## 2023-12-28 DIAGNOSIS — Z13.0 SCREENING FOR BLOOD DISEASE: ICD-10-CM

## 2023-12-28 DIAGNOSIS — Z13.29 SCREENING FOR THYROID DISORDER: ICD-10-CM

## 2023-12-28 DIAGNOSIS — Z13.228 SCREENING FOR METABOLIC DISORDER: ICD-10-CM

## 2023-12-28 NOTE — TELEPHONE ENCOUNTER
Please enter lab orders for the patient's upcoming physical appointment. Physical scheduled: Your appointments       Date & Time Appointment Department [facility])    Jan 03, 2024  2:15 PM CST Adult Physical with Juaquin Rincon NP [facility], [address], [place] ([address])    PLEASE NOTE - Most insurances allow a Complete Physical once every 366 days. Please schedule accordingly. Please arrive 15 minutes prior to your scheduled appointment. Please also bring your Insurance card, Photo ID, and your medication bottles or a list of your current medication. If you no longer require this appointment, please contact your physician office to cancel. Bill Hdz [address] [facility] [phone]           Preferred lab: [facility])     The patient has been notified to complete fasting labs prior to their physical appointment.

## 2024-03-14 ENCOUNTER — TELEPHONE (OUTPATIENT)
Dept: FAMILY MEDICINE CLINIC | Facility: CLINIC | Age: 59
End: 2024-03-14

## 2024-03-14 DIAGNOSIS — Z01.818 PRE-OP TESTING: Primary | ICD-10-CM

## 2024-03-14 NOTE — TELEPHONE ENCOUNTER
Called and LMOM that he needs to get labs done the orders were placed and he should get this done before the appointment

## 2024-03-14 NOTE — TELEPHONE ENCOUNTER
Pt is scheduled for Left Total Hip Arthroplasty for 04/18 with Dr. Walter Rdz.  Pre op testing needed is Statement of medical clerance H&P   Labs: CBC, CMP, PT/INR, PTT, UA, A1C   12 lead EKG   Pt is scheduled with Dr. Nunez for 04/05.

## 2024-03-19 ENCOUNTER — TELEPHONE (OUTPATIENT)
Dept: FAMILY MEDICINE CLINIC | Facility: CLINIC | Age: 59
End: 2024-03-19

## 2024-03-19 NOTE — TELEPHONE ENCOUNTER
Called and talked to patient explained that he needed to get his labs less than 30 days prior to his surgery. He will call central scheduling to get this done.

## 2024-04-05 ENCOUNTER — LAB ENCOUNTER (OUTPATIENT)
Dept: LAB | Age: 59
End: 2024-04-05
Attending: FAMILY MEDICINE
Payer: COMMERCIAL

## 2024-04-05 ENCOUNTER — OFFICE VISIT (OUTPATIENT)
Dept: FAMILY MEDICINE CLINIC | Facility: CLINIC | Age: 59
End: 2024-04-05
Payer: COMMERCIAL

## 2024-04-05 VITALS
DIASTOLIC BLOOD PRESSURE: 80 MMHG | TEMPERATURE: 97 F | BODY MASS INDEX: 34.03 KG/M2 | HEIGHT: 74.5 IN | SYSTOLIC BLOOD PRESSURE: 122 MMHG | WEIGHT: 268 LBS | RESPIRATION RATE: 16 BRPM | HEART RATE: 84 BPM

## 2024-04-05 DIAGNOSIS — Z13.220 SCREENING FOR LIPID DISORDERS: ICD-10-CM

## 2024-04-05 DIAGNOSIS — E78.00 PURE HYPERCHOLESTEROLEMIA: ICD-10-CM

## 2024-04-05 DIAGNOSIS — Z01.818 PRE-OP TESTING: ICD-10-CM

## 2024-04-05 DIAGNOSIS — M16.12 PRIMARY OSTEOARTHRITIS OF LEFT HIP: ICD-10-CM

## 2024-04-05 DIAGNOSIS — Z01.818 PRE-OP EXAM: Primary | ICD-10-CM

## 2024-04-05 DIAGNOSIS — Z13.29 SCREENING FOR THYROID DISORDER: ICD-10-CM

## 2024-04-05 DIAGNOSIS — Z85.46 HISTORY OF PROSTATE CANCER: ICD-10-CM

## 2024-04-05 DIAGNOSIS — C61 PRIMARY MALIGNANT NEOPLASM OF PROSTATE (HCC): ICD-10-CM

## 2024-04-05 PROBLEM — C85.90 MALIGNANT LYMPHOMA, NON-HODGKIN'S (HCC): Status: RESOLVED | Noted: 2019-09-04 | Resolved: 2024-04-05

## 2024-04-05 LAB
ALBUMIN SERPL-MCNC: 3.9 G/DL (ref 3.4–5)
ALBUMIN/GLOB SERPL: 1.1 {RATIO} (ref 1–2)
ALP LIVER SERPL-CCNC: 80 U/L
ALT SERPL-CCNC: 62 U/L
ANION GAP SERPL CALC-SCNC: 8 MMOL/L (ref 0–18)
APTT PPP: 32.1 SECONDS (ref 23.3–35.6)
AST SERPL-CCNC: 33 U/L (ref 15–37)
ATRIAL RATE: 71 BPM
BASOPHILS # BLD AUTO: 0.04 X10(3) UL (ref 0–0.2)
BASOPHILS NFR BLD AUTO: 0.6 %
BILIRUB SERPL-MCNC: 0.7 MG/DL (ref 0.1–2)
BILIRUB UR QL STRIP.AUTO: NEGATIVE
BUN BLD-MCNC: 16 MG/DL (ref 9–23)
CALCIUM BLD-MCNC: 9 MG/DL (ref 8.5–10.1)
CHLORIDE SERPL-SCNC: 102 MMOL/L (ref 98–112)
CHOLEST SERPL-MCNC: 133 MG/DL (ref ?–200)
CLARITY UR REFRACT.AUTO: CLEAR
CO2 SERPL-SCNC: 25 MMOL/L (ref 21–32)
COLOR UR AUTO: YELLOW
CREAT BLD-MCNC: 0.84 MG/DL
EGFRCR SERPLBLD CKD-EPI 2021: 101 ML/MIN/1.73M2 (ref 60–?)
EOSINOPHIL # BLD AUTO: 0.27 X10(3) UL (ref 0–0.7)
EOSINOPHIL NFR BLD AUTO: 4.4 %
ERYTHROCYTE [DISTWIDTH] IN BLOOD BY AUTOMATED COUNT: 12.9 %
EST. AVERAGE GLUCOSE BLD GHB EST-MCNC: 117 MG/DL (ref 68–126)
FASTING PATIENT LIPID ANSWER: YES
FASTING STATUS PATIENT QL REPORTED: YES
GLOBULIN PLAS-MCNC: 3.5 G/DL (ref 2.8–4.4)
GLUCOSE BLD-MCNC: 108 MG/DL (ref 70–99)
GLUCOSE UR STRIP.AUTO-MCNC: NORMAL MG/DL
HBA1C MFR BLD: 5.7 % (ref ?–5.7)
HCT VFR BLD AUTO: 39.6 %
HDLC SERPL-MCNC: 56 MG/DL (ref 40–59)
HGB BLD-MCNC: 13.4 G/DL
IMM GRANULOCYTES # BLD AUTO: 0.02 X10(3) UL (ref 0–1)
IMM GRANULOCYTES NFR BLD: 0.3 %
INR BLD: 1.13 (ref 0.8–1.2)
KETONES UR STRIP.AUTO-MCNC: NEGATIVE MG/DL
LDLC SERPL CALC-MCNC: 65 MG/DL (ref ?–100)
LEUKOCYTE ESTERASE UR QL STRIP.AUTO: NEGATIVE
LYMPHOCYTES # BLD AUTO: 1.24 X10(3) UL (ref 1–4)
LYMPHOCYTES NFR BLD AUTO: 20.1 %
MCH RBC QN AUTO: 30.7 PG (ref 26–34)
MCHC RBC AUTO-ENTMCNC: 33.8 G/DL (ref 31–37)
MCV RBC AUTO: 90.8 FL
MONOCYTES # BLD AUTO: 0.53 X10(3) UL (ref 0.1–1)
MONOCYTES NFR BLD AUTO: 8.6 %
NEUTROPHILS # BLD AUTO: 4.08 X10 (3) UL (ref 1.5–7.7)
NEUTROPHILS # BLD AUTO: 4.08 X10(3) UL (ref 1.5–7.7)
NEUTROPHILS NFR BLD AUTO: 66 %
NITRITE UR QL STRIP.AUTO: NEGATIVE
NONHDLC SERPL-MCNC: 77 MG/DL (ref ?–130)
OSMOLALITY SERPL CALC.SUM OF ELEC: 282 MOSM/KG (ref 275–295)
P AXIS: 42 DEGREES
P-R INTERVAL: 172 MS
PH UR STRIP.AUTO: 6.5 [PH] (ref 5–8)
PLATELET # BLD AUTO: 247 10(3)UL (ref 150–450)
POTASSIUM SERPL-SCNC: 4.2 MMOL/L (ref 3.5–5.1)
PROT SERPL-MCNC: 7.4 G/DL (ref 6.4–8.2)
PROT UR STRIP.AUTO-MCNC: NEGATIVE MG/DL
PROTHROMBIN TIME: 14.6 SECONDS (ref 11.6–14.8)
PSA SERPL-MCNC: 0.01 NG/ML (ref ?–4)
Q-T INTERVAL: 412 MS
QRS DURATION: 88 MS
QTC CALCULATION (BEZET): 447 MS
R AXIS: -23 DEGREES
RBC # BLD AUTO: 4.36 X10(6)UL
RBC UR QL AUTO: NEGATIVE
SODIUM SERPL-SCNC: 135 MMOL/L (ref 136–145)
SP GR UR STRIP.AUTO: 1.02 (ref 1–1.03)
T AXIS: 33 DEGREES
TRIGL SERPL-MCNC: 57 MG/DL (ref 30–149)
TSI SER-ACNC: 1.39 MIU/ML (ref 0.36–3.74)
UROBILINOGEN UR STRIP.AUTO-MCNC: NORMAL MG/DL
VENTRICULAR RATE: 71 BPM
VLDLC SERPL CALC-MCNC: 9 MG/DL (ref 0–30)
WBC # BLD AUTO: 6.2 X10(3) UL (ref 4–11)

## 2024-04-05 PROCEDURE — 80061 LIPID PANEL: CPT | Performed by: NURSE PRACTITIONER

## 2024-04-05 PROCEDURE — 84443 ASSAY THYROID STIM HORMONE: CPT | Performed by: NURSE PRACTITIONER

## 2024-04-05 PROCEDURE — 3008F BODY MASS INDEX DOCD: CPT | Performed by: FAMILY MEDICINE

## 2024-04-05 PROCEDURE — 85610 PROTHROMBIN TIME: CPT | Performed by: FAMILY MEDICINE

## 2024-04-05 PROCEDURE — 85025 COMPLETE CBC W/AUTO DIFF WBC: CPT | Performed by: FAMILY MEDICINE

## 2024-04-05 PROCEDURE — 99243 OFF/OP CNSLTJ NEW/EST LOW 30: CPT | Performed by: FAMILY MEDICINE

## 2024-04-05 PROCEDURE — 93000 ELECTROCARDIOGRAM COMPLETE: CPT | Performed by: FAMILY MEDICINE

## 2024-04-05 PROCEDURE — 84153 ASSAY OF PSA TOTAL: CPT | Performed by: NURSE PRACTITIONER

## 2024-04-05 PROCEDURE — 85730 THROMBOPLASTIN TIME PARTIAL: CPT | Performed by: FAMILY MEDICINE

## 2024-04-05 PROCEDURE — 83036 HEMOGLOBIN GLYCOSYLATED A1C: CPT | Performed by: FAMILY MEDICINE

## 2024-04-05 PROCEDURE — 3079F DIAST BP 80-89 MM HG: CPT | Performed by: FAMILY MEDICINE

## 2024-04-05 PROCEDURE — 80053 COMPREHEN METABOLIC PANEL: CPT | Performed by: FAMILY MEDICINE

## 2024-04-05 PROCEDURE — 81003 URINALYSIS AUTO W/O SCOPE: CPT | Performed by: FAMILY MEDICINE

## 2024-04-05 PROCEDURE — 3074F SYST BP LT 130 MM HG: CPT | Performed by: FAMILY MEDICINE

## 2024-04-05 NOTE — PROGRESS NOTES
Bairon Stein is a 58 year old male who presents for a pre-operative physical exam. Patient is to have left Total Hip Arthroplasty, to be done by Dr. Walter Rdz at [facility] on 4/18/24.    Pt has had previous anesthesia:  Yes.  Previous complications:  No  Hx of DVT/PE:  No    HPI:   Patient's history includes:    Colon:  9/2/2022 - repeat in 5-10 years  Gets executive physical yearly at [place]. Labs done there yearly  Lipids:  Atorvastatin 20mg daily.  LDL 73  Seeing Dr. Guajardo: hx of squamous cell.   Hx of shingles in 2017.    Prostate cancer: s/p prostatectomy 11/2019.  Last PSA undetectable 10/2023    ED:  Using vigra prn     1) High Risk Cardiac Conditions   1) Recent MI - no   2) Decompensated Heart Failure - no   3) Unstable angina - no   4) Symptomatic arrythmia - no   5) Sx Valvular Disease - no    2) Intermediate Risk Factors - no diabetes, kidney disease, or history of heart failure    2) Functional Status - > 4 mets    3) Surgery Specific Risk - low risk    4) Further Noninvasive evaluation    1) EKG -  Ordered and reviewed   2)  Echo - no dyspnea, not ordered   3) Stress Testing - no active cardiac disease, not ordered    Current Outpatient Medications   Medication Sig Dispense Refill    atorvastatin 20 MG Oral Tab Take 1 tablet (20 mg total) by mouth nightly. 90 tablet 3    B COMPLEX VITAMINS ER OR Take 1 tablet by mouth daily.      naproxen 500 MG Oral Tab Take 1 tablet (500 mg total) by mouth 2 (two) times daily with meals. 60 tablet 0    Multiple Vitamins-Minerals (MULTIVITAMIN ADULT OR) Take 2 tablets by mouth daily.      aspirin EC 81 MG Oral Tab EC Take 81 mg by mouth daily.      Sildenafil Citrate 20 MG Oral Tab Take 2 tablets by mouth one hour prior to sex as needed. Do not take more than one dose every 24 hours.  2      Allergies:   Allergies   Allergen Reactions    Mangifera Indica SWELLING     face    Chris Flavor UNKNOWN      Past Medical History:   Diagnosis Date     Hyperlipidemia       Past Surgical History:   Procedure Laterality Date    KNEE ARTHROSCOPY Right 09/28/2011    Dr. Sánchez     KNEE ARTHROSCOPY Right 2005    KNEE ARTHROSCOPY Left 1997    KNEE REPLACEMENT SURGERY      b/l      Family History   Problem Relation Age of Onset    Cancer Mother         unsure type    Cancer Father         Prostate    Other (Other) Brother         cirrhosis      Social History:   Social History     Socioeconomic History    Marital status: Single   Tobacco Use    Smoking status: Never    Smokeless tobacco: Never   Vaping Use    Vaping Use: Never used   Substance and Sexual Activity    Alcohol use: Yes     Comment: 3 beers a week    Drug use: Not Currently   Other Topics Concern    Caffeine Concern Yes     Comment: 2 servings a day    Exercise Yes     Comment: 4-5 days a week    Seat Belt Yes         REVIEW OF SYSTEMS:   GENERAL: feels well otherwise  LUNGS: denies shortness of breath  CARDIOVASCULAR: denies chest pain  GI: denies abdominal pain  : denies dysuria  NEURO: denies headaches    EXAM:   /80   Pulse 84   Temp 97.3 °F (36.3 °C)   Resp 16   Ht 6' 2.5\" (1.892 m)   Wt 268 lb (121.6 kg)   BMI 33.95 kg/m²   GENERAL: well developed, well nourished,in no apparent distress  HEENT: atraumatic, normocephalic, O/P clear  NECK: supple,no adenopathy  LUNGS: clear to auscultation  CARDIO: RRR without murmur  GI: good BS's,no masses, HSM or tenderness  EXTREMITIES: no edema  NEURO: Alert    ASSESSMENT AND PLAN:   Bairon Stein is a 58 year old male who presents for a pre-operative physical exam.   1. Pre-op exam  - EKG with interpretation and Report -IN OFFICE [[id number]]    2. Primary osteoarthritis of left hip    3. History of prostate cancer    4. Pure hypercholesterolemia    EKG Normal.  Pt has no significant history of cardiac or pulmonary conditions. Pt is an acceptable surgical candidate.

## 2024-07-23 RX ORDER — ATORVASTATIN CALCIUM 20 MG/1
20 TABLET, FILM COATED ORAL NIGHTLY
Qty: 90 TABLET | Refills: 0 | Status: SHIPPED | OUTPATIENT
Start: 2024-07-23

## 2024-08-26 RX ORDER — ATORVASTATIN CALCIUM 20 MG/1
20 TABLET, FILM COATED ORAL NIGHTLY
Qty: 90 TABLET | Refills: 0 | Status: CANCELLED | OUTPATIENT
Start: 2024-08-26

## 2024-09-17 ENCOUNTER — PATIENT MESSAGE (OUTPATIENT)
Dept: FAMILY MEDICINE CLINIC | Facility: CLINIC | Age: 59
End: 2024-09-17

## 2024-09-17 ENCOUNTER — TELEPHONE (OUTPATIENT)
Dept: FAMILY MEDICINE CLINIC | Facility: CLINIC | Age: 59
End: 2024-09-17

## 2024-09-17 DIAGNOSIS — Z13.29 SCREENING FOR THYROID DISORDER: ICD-10-CM

## 2024-09-17 DIAGNOSIS — Z13.228 SCREENING FOR METABOLIC DISORDER: ICD-10-CM

## 2024-09-17 DIAGNOSIS — Z13.220 SCREENING FOR LIPID DISORDERS: ICD-10-CM

## 2024-09-17 DIAGNOSIS — Z13.0 SCREENING FOR BLOOD DISEASE: ICD-10-CM

## 2024-09-17 DIAGNOSIS — Z12.5 SCREENING FOR MALIGNANT NEOPLASM OF PROSTATE: ICD-10-CM

## 2024-09-17 NOTE — TELEPHONE ENCOUNTER
Please enter lab orders for the patient's upcoming physical appointment.     Physical scheduled:   Your appointments       Date & Time Appointment Department ([place])    Sep 26, 2024 8:30 AM CDT Adult Physical with Lawrence Miles NP [facility])    PLEASE NOTE - Most insurances allow a Complete Physical once every 366 days. Please schedule accordingly.    Please arrive 15 minutes prior to your scheduled appointment. Please also bring your Insurance card, Photo ID, and your medication bottles or a list of your current medication.    If you no longer require this appointment, please contact your physician office to cancel.              [facility]  [address] [place] [id number]  [phone]           Preferred lab: [facility] ([facility])     The patient has been notified to complete fasting labs prior to their physical appointment.

## 2024-09-18 NOTE — TELEPHONE ENCOUNTER
From: Bairon Stein  To: Lawrence Miles  Sent: 9/17/2024 6:23 PM CDT  Subject: Bllod work    I have a physical with you on the 26th. Do I need to schedule blood work that day.

## 2024-09-26 ENCOUNTER — LAB ENCOUNTER (OUTPATIENT)
Dept: LAB | Age: 59
End: 2024-09-26
Attending: NURSE PRACTITIONER
Payer: COMMERCIAL

## 2024-09-26 ENCOUNTER — OFFICE VISIT (OUTPATIENT)
Dept: FAMILY MEDICINE CLINIC | Facility: CLINIC | Age: 59
End: 2024-09-26
Payer: COMMERCIAL

## 2024-09-26 VITALS
SYSTOLIC BLOOD PRESSURE: 118 MMHG | HEART RATE: 62 BPM | BODY MASS INDEX: 33.71 KG/M2 | WEIGHT: 262.63 LBS | HEIGHT: 74 IN | RESPIRATION RATE: 16 BRPM | OXYGEN SATURATION: 98 % | DIASTOLIC BLOOD PRESSURE: 80 MMHG

## 2024-09-26 DIAGNOSIS — R73.9 HYPERGLYCEMIA: ICD-10-CM

## 2024-09-26 DIAGNOSIS — E78.00 PURE HYPERCHOLESTEROLEMIA: ICD-10-CM

## 2024-09-26 DIAGNOSIS — Z00.00 ROUTINE PHYSICAL EXAMINATION: Primary | ICD-10-CM

## 2024-09-26 DIAGNOSIS — R20.2 TINGLING OF BOTH FEET: ICD-10-CM

## 2024-09-26 DIAGNOSIS — Z13.220 SCREENING FOR LIPID DISORDERS: ICD-10-CM

## 2024-09-26 DIAGNOSIS — Z13.0 SCREENING FOR BLOOD DISEASE: ICD-10-CM

## 2024-09-26 DIAGNOSIS — E87.1 HYPONATREMIA: Primary | ICD-10-CM

## 2024-09-26 DIAGNOSIS — D47.9 LYMPHOPROLIFERATIVE DISORDER (HCC): ICD-10-CM

## 2024-09-26 DIAGNOSIS — Z12.5 SCREENING FOR MALIGNANT NEOPLASM OF PROSTATE: ICD-10-CM

## 2024-09-26 DIAGNOSIS — Z13.29 SCREENING FOR THYROID DISORDER: ICD-10-CM

## 2024-09-26 DIAGNOSIS — Z85.46 HISTORY OF PROSTATE CANCER: ICD-10-CM

## 2024-09-26 DIAGNOSIS — Z13.228 SCREENING FOR METABOLIC DISORDER: ICD-10-CM

## 2024-09-26 DIAGNOSIS — N52.9 ERECTILE DYSFUNCTION, UNSPECIFIED ERECTILE DYSFUNCTION TYPE: ICD-10-CM

## 2024-09-26 PROBLEM — R59.1 LYMPHADENOPATHY: Status: RESOLVED | Noted: 2019-08-29 | Resolved: 2024-09-26

## 2024-09-26 LAB
ALBUMIN SERPL-MCNC: 4.4 G/DL (ref 3.2–4.8)
ALBUMIN/GLOB SERPL: 1.5 {RATIO} (ref 1–2)
ALP LIVER SERPL-CCNC: 71 U/L
ALT SERPL-CCNC: 28 U/L
ANION GAP SERPL CALC-SCNC: 4 MMOL/L (ref 0–18)
AST SERPL-CCNC: 25 U/L (ref ?–34)
BASOPHILS # BLD AUTO: 0.04 X10(3) UL (ref 0–0.2)
BASOPHILS NFR BLD AUTO: 0.9 %
BILIRUB SERPL-MCNC: 0.6 MG/DL (ref 0.3–1.2)
BUN BLD-MCNC: 14 MG/DL (ref 9–23)
CALCIUM BLD-MCNC: 10.1 MG/DL (ref 8.7–10.4)
CHLORIDE SERPL-SCNC: 101 MMOL/L (ref 98–112)
CHOLEST SERPL-MCNC: 123 MG/DL (ref ?–200)
CO2 SERPL-SCNC: 28 MMOL/L (ref 21–32)
COMPLEXED PSA SERPL-MCNC: <0.04 NG/ML (ref ?–4)
CREAT BLD-MCNC: 0.89 MG/DL
EGFRCR SERPLBLD CKD-EPI 2021: 99 ML/MIN/1.73M2 (ref 60–?)
EOSINOPHIL # BLD AUTO: 0.22 X10(3) UL (ref 0–0.7)
EOSINOPHIL NFR BLD AUTO: 4.8 %
ERYTHROCYTE [DISTWIDTH] IN BLOOD BY AUTOMATED COUNT: 13.4 %
EST. AVERAGE GLUCOSE BLD GHB EST-MCNC: 120 MG/DL (ref 68–126)
FASTING PATIENT LIPID ANSWER: YES
FASTING STATUS PATIENT QL REPORTED: YES
GLOBULIN PLAS-MCNC: 3 G/DL (ref 2–3.5)
GLUCOSE BLD-MCNC: 96 MG/DL (ref 70–99)
HBA1C MFR BLD: 5.8 % (ref ?–5.7)
HCT VFR BLD AUTO: 39.1 %
HDLC SERPL-MCNC: 50 MG/DL (ref 40–59)
HGB BLD-MCNC: 13.9 G/DL
IMM GRANULOCYTES # BLD AUTO: 0.01 X10(3) UL (ref 0–1)
IMM GRANULOCYTES NFR BLD: 0.2 %
LDLC SERPL CALC-MCNC: 58 MG/DL (ref ?–100)
LYMPHOCYTES # BLD AUTO: 1.4 X10(3) UL (ref 1–4)
LYMPHOCYTES NFR BLD AUTO: 30.5 %
MCH RBC QN AUTO: 30.5 PG (ref 26–34)
MCHC RBC AUTO-ENTMCNC: 35.5 G/DL (ref 31–37)
MCV RBC AUTO: 85.9 FL
MONOCYTES # BLD AUTO: 0.42 X10(3) UL (ref 0.1–1)
MONOCYTES NFR BLD AUTO: 9.2 %
NEUTROPHILS # BLD AUTO: 2.5 X10 (3) UL (ref 1.5–7.7)
NEUTROPHILS # BLD AUTO: 2.5 X10(3) UL (ref 1.5–7.7)
NEUTROPHILS NFR BLD AUTO: 54.4 %
NONHDLC SERPL-MCNC: 73 MG/DL (ref ?–130)
OSMOLALITY SERPL CALC.SUM OF ELEC: 276 MOSM/KG (ref 275–295)
PLATELET # BLD AUTO: 249 10(3)UL (ref 150–450)
POTASSIUM SERPL-SCNC: 4 MMOL/L (ref 3.5–5.1)
PROT SERPL-MCNC: 7.4 G/DL (ref 5.7–8.2)
RBC # BLD AUTO: 4.55 X10(6)UL
SODIUM SERPL-SCNC: 133 MMOL/L (ref 136–145)
TRIGL SERPL-MCNC: 73 MG/DL (ref 30–149)
TSI SER-ACNC: 2.69 MIU/ML (ref 0.55–4.78)
VIT B12 SERPL-MCNC: >2000 PG/ML (ref 211–911)
VLDLC SERPL CALC-MCNC: 11 MG/DL (ref 0–30)
WBC # BLD AUTO: 4.6 X10(3) UL (ref 4–11)

## 2024-09-26 PROCEDURE — 80061 LIPID PANEL: CPT | Performed by: NURSE PRACTITIONER

## 2024-09-26 PROCEDURE — 84153 ASSAY OF PSA TOTAL: CPT | Performed by: NURSE PRACTITIONER

## 2024-09-26 PROCEDURE — 82607 VITAMIN B-12: CPT | Performed by: NURSE PRACTITIONER

## 2024-09-26 PROCEDURE — 83036 HEMOGLOBIN GLYCOSYLATED A1C: CPT | Performed by: NURSE PRACTITIONER

## 2024-09-26 PROCEDURE — 80050 GENERAL HEALTH PANEL: CPT | Performed by: NURSE PRACTITIONER

## 2024-09-26 RX ORDER — IBUPROFEN 600 MG/1
1 TABLET, FILM COATED ORAL EVERY 6 HOURS PRN
COMMUNITY

## 2024-09-26 RX ORDER — ATORVASTATIN CALCIUM 20 MG/1
20 TABLET, FILM COATED ORAL NIGHTLY
Qty: 90 TABLET | Refills: 3 | Status: SHIPPED | OUTPATIENT
Start: 2024-09-26

## 2024-09-26 NOTE — PROGRESS NOTES
Bairon Stein is a 59 year old male who presents for a complete physical exam.     HPI:   Pt complains of 2-3 month history of intermittent tingling in both feet. Reports is present daily but is unable to describe pattern to symptoms. Reports sometimes is aware of sensation and other times is not. Denies back pain. Denies redness or swelling of legs/feet. Denies numbness to feet or difficulty walking. Does have some prior history of hyperglycemia. Denies polyuria, polydipsia, polyphagia, shakiness, dizziness or visual changes.     Has prior diagnosis of lymphoproliferative disorder- denies night sweats, weight loss, fevers. Has had normal CBC's in last few years.     Had prostate cancer in 2019 with radical prostatectomy. Has been feeling well since. Denies difficulty urinating.     Dyslipidemia- taking medication as ordered w/o side effects. Denies chest pain, palpitations, SOB, ZELAYA, headaches, dizziness, lightheadedness, visual changes.     ED- managed with PRN sildenafil with good response. Prostatectomy as above.      Prostate cancer in 2018 with prostatectomy in 2019.     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 Is not  with 1 child- is now senior in college. Feels he eats an overall healthy diet. Does exercise routinely. Reports is non-smoker. Drinks 3-4 alcoholic drinks per week.     Colon cancer screening:  UTD due in 2027.     Wt Readings from Last 6 Encounters:   09/26/24 262 lb 9.6 oz (119.1 kg)   04/05/24 268 lb (121.6 kg)   06/15/22 265 lb 9.6 oz (120.5 kg)   05/17/21 254 lb (115.2 kg)   12/15/20 274 lb 12.8 oz (124.6 kg)   11/06/20 267 lb (121.1 kg)       Cholesterol, Total (mg/dL)   Date Value   04/05/2024 133   05/04/2021 179   05/21/2018 171     HDL Cholesterol (mg/dL)   Date Value   04/05/2024 56   05/04/2021 51   05/21/2018 55     LDL Cholesterol (mg/dL)   Date Value   04/05/2024 65   05/04/2021 104 (H)     AST (U/L)   Date Value   04/05/2024 33   05/04/2021 36    08/18/2018 21     ALT (U/L)   Date Value   04/05/2024 62 (H)   05/04/2021 57   08/18/2018 32      Current Outpatient Medications   Medication Sig Dispense Refill    atorvastatin 20 MG Oral Tab Take 1 tablet (20 mg total) by mouth nightly. 90 tablet 0    B COMPLEX VITAMINS ER OR Take 1 tablet by mouth daily.      naproxen 500 MG Oral Tab Take 1 tablet (500 mg total) by mouth 2 (two) times daily with meals. 60 tablet 0    Multiple Vitamins-Minerals (MULTIVITAMIN ADULT OR) Take 2 tablets by mouth daily.      aspirin EC 81 MG Oral Tab EC Take 1 tablet (81 mg total) by mouth daily.      Sildenafil Citrate 20 MG Oral Tab Take 2 tablets by mouth one hour prior to sex as needed. Do not take more than one dose every 24 hours.  2    ibuprofen 600 MG Oral Tab Take 1 tablet (600 mg total) by mouth every 6 (six) hours as needed.        Past Medical History:    Hyperlipidemia      Past Surgical History:   Procedure Laterality Date    Knee arthroscopy Right 09/28/2011    Dr. Sánchez     Knee arthroscopy Right 2005    Knee arthroscopy Left 1997    Knee replacement surgery      b/l      Family History   Problem Relation Age of Onset    Cancer Mother         unsure type    Cancer Father         Prostate    Other (Other) Brother         cirrhosis      Social History     Socioeconomic History    Marital status: Single   Tobacco Use    Smoking status: Never    Smokeless tobacco: Never   Vaping Use    Vaping status: Never Used   Substance and Sexual Activity    Alcohol use: Yes     Comment: 3 beers a week    Drug use: Not Currently   Other Topics Concern    Caffeine Concern Yes     Comment: 2 servings a day    Exercise Yes     Comment: 4-5 days a week    Seat Belt Yes     Social Determinants of Health     Financial Resource Strain: Low Risk  (9/29/2023)    [facility], [facility]    Overall Financial Resource Strain (CARDIA)     Difficulty of Paying Living Expenses: Not hard at all   Food Insecurity: No Food Insecurity  (9/29/2023)    Received from [facility]    Hunger Vital Sign     Worried About Running Out of Food in the Last Year: Never true     Ran Out of Food in the Last Year: Never true   Transportation Needs: No Transportation Needs (9/29/2023)    Received from [facility]    PRAPARE - Transportation     Lack of Transportation (Medical): No     Lack of Transportation (Non-Medical): No   Physical Activity: Sufficiently Active (9/26/2023)    Received from [facility]    Exercise Vital Sign     Days of Exercise per Week: 6 days     Minutes of Exercise per Session: 50 min   Stress: Stress Concern Present (8/29/2022)    Received from [facility]    [employer] - Occupational Stress Questionnaire     Feeling of Stress : To some extent   Social Connections: Socially Isolated (8/29/2022)    Received from [facility]    Social Connection and Isolation Panel [NHANES]     Frequency of Communication with Friends and Family: More than three times a week     Frequency of Social Gatherings with Friends and Family: More than three times a week     Attends Temple Services: Never     Active Member of Clubs or Organizations: No     Attends Club or Organization Meetings: Never     Marital Status:    Housing Stability: Low Risk  (9/29/2023)    Received from [facility]    Housing Stability     What is your living situation today?: I have a steady place to live      Social History: as above     Health Maintenance  Immunizations: Recommend flu vaccine for 1587-5811 flu season.     Immunization History   Administered Date(s) Administered    >=3 YRS TRI  MULTIDOSE VIAL (36264) FLU CLINIC 11/25/2014    Covid-19 Vaccine Pfizer 30 mcg/0.3 ml 04/13/2021, 05/04/2021, 12/15/2021    Covid-19 Vaccine Pfizer Bivalent 30mcg/0.3mL 08/23/2023    FLUZONE 6 months and older PFS 0.5 ml (87789) 09/29/2015, 10/02/2019, 09/15/2020, 01/04/2024     Influenza 10/01/2013    Influenza(Afluria)0.5ml QIV PFS 09/25/2018, 09/15/2020    TD 05/22/2019    TDAP 01/01/2007    Zoster Vaccine Recombinant Adjuvanted (Shingrix) 07/08/2019, 11/18/2019         REVIEW OF SYSTEMS:   GENERAL: feels well otherwise. No fever, chills, malaise.  SKIN: denies any unusual skin lesions, rash or pruritis.  EYES: denies blurred/double vision, light sensitivity or visual disturbances.  HEENT: denies nasal congestion, sinus pain/tenderness. Denies neck stiffness.  LUNGS: denies dyspnea, wheezing, SOB, ZELAYA, cough.  CARDIOVASCULAR: denies chest pain on exertion, palpitations, edema, orthopnea.  GI: denies abdominal pain, heartburn, diarrhea or constipation.  : denies dysuria, frequency, urgency, hematuria, changes in stream or nocturia.   MUSCULOSKELETAL: denies back pain.  NEURO: denies headaches, dizziness, syncope.    EXAM:   /80   Pulse 62   Resp 16   Ht 6' 2\" (1.88 m)   Wt 262 lb 9.6 oz (119.1 kg)   SpO2 98%   BMI 33.72 kg/m²   Body mass index is 33.72 kg/m².   GENERAL: well developed, well nourished,in no apparent distress  SKIN: Skin warm/dry. Color appropriate for ethnicity. No rashes, suspicious lesions.  HEENT: atraumatic, normocephalic. Bilateral TM clear w/o erythema or bulge  EYES: PERRLA, EOMI. Conjunctiva are clear. Sclera white  NECK: supple w/o masses/tenderness/ adenopathy, no bruits/JVD, Thyroid symmetrical w/o enlargement  LUNGS: clear to auscultation bilaterally, effort normal. Symmetrical expansion during respirations.  CARDIO: RRR without murmurs. No S3/S4.   GI: Soft, non-tender/non-distended, BS(+)x4, no masses, HSM or CVA tenderness.  MUSCULOSKELETAL: muscle strength grade 5 to all extremities, back non-tender.   EXTREMITIES: no edema, full ROM to all extremities. Patellar DTR 2+ bilaterally   NEURO: A/Ox3, cranial nerves II-XII intact, motor/sensory function grossly intact.     ASSESSMENT AND PLAN:     HEALTH MAINTENANCE:     Encounter Diagnoses   Name  Primary?    Routine physical examination- adult male in his usual state of health. Discussed appropriate health guidance including importance of getting daily exercise and healthy diet choices. Await results of screening labs drawn this morning.   Yes    Tingling of both feet- check B12 and A1C. If normal complete EMG as ordered. Verbalized understanding of instructions and agreeable to this plan of care.        Hyperglycemia- check A1C       Lymphoproliferative disorder (HCC)- no recent issues. Await CBC.        History of prostate cancer- monitor. PSA pending.        Pure hypercholesterolemia- continue management. Await labs.        Erectile dysfunction, unspecified erectile dysfunction type- Stable. Continue current management.           Orders Placed This Encounter   Procedures    Vitamin B12 [E]    Hemoglobin A1C       Meds & Refills for this Visit:  Requested Prescriptions      No prescriptions requested or ordered in this encounter       Imaging & Consults:  None    No follow-ups on file.  Patient Instructions                       Apply ice pack to lower back area 3-4 times daily for 15-20 minutes each time for 24 houts, then apply warm compress to area 3-4 times daily for 15-20 minutes each time for another 3-5 days.

## 2024-09-26 NOTE — PATIENT INSTRUCTIONS
Apply ice pack to lower back area 3-4 times daily for 15-20 minutes each time for 24 houts, then apply warm compress to area 3-4 times daily for 15-20 minutes each time for another 3-5 days.

## (undated) NOTE — ED AVS SNAPSHOT
Miles Crespo   MRN: [id number]    Department:  [address] in [place]   Date of Visit:  8/18/2018           Disclosure     Insurance plans vary and the physician(s) referred by the ER may not be covered by your plan.  Please contact tell this physician (or your personal doctor if your instructions are to return to your personal doctor) about any new or lasting problems. The primary care or specialist physician will see patients referred from the [facility] Emergency Department.  Arthor Bernheim